# Patient Record
Sex: FEMALE | Race: BLACK OR AFRICAN AMERICAN | NOT HISPANIC OR LATINO | Employment: OTHER | URBAN - METROPOLITAN AREA
[De-identification: names, ages, dates, MRNs, and addresses within clinical notes are randomized per-mention and may not be internally consistent; named-entity substitution may affect disease eponyms.]

---

## 2018-05-22 ENCOUNTER — HOSPITAL ENCOUNTER (EMERGENCY)
Facility: HOSPITAL | Age: 68
Discharge: HOME | End: 2018-05-22
Attending: EMERGENCY MEDICINE
Payer: MEDICARE

## 2018-05-22 VITALS
HEART RATE: 62 BPM | DIASTOLIC BLOOD PRESSURE: 70 MMHG | TEMPERATURE: 98 F | RESPIRATION RATE: 20 BRPM | SYSTOLIC BLOOD PRESSURE: 166 MMHG | BODY MASS INDEX: 24.02 KG/M2 | WEIGHT: 158 LBS | OXYGEN SATURATION: 99 %
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
## 2018-05-22 DIAGNOSIS — N30.00 ACUTE CYSTITIS WITHOUT HEMATURIA: ICD-10-CM

## 2018-05-22 LAB
ANION GAP SERPL CALC-SCNC: 14 MMOL/L
BACTERIA #/AREA URNS AUTO: ABNORMAL /HPF
BASOPHILS # BLD AUTO: 0.04 K/UL
BASOPHILS NFR BLD: 0.4 %
BILIRUB UR QL STRIP: NEGATIVE
BUN SERPL-MCNC: 21 MG/DL
BUN SERPL-MCNC: 21 MG/DL (ref 6–30)
CALCIUM SERPL-MCNC: 10.6 MG/DL
CHLORIDE SERPL-SCNC: 103 MMOL/L
CHLORIDE SERPL-SCNC: 103 MMOL/L (ref 95–110)
CLARITY UR REFRACT.AUTO: ABNORMAL
CO2 SERPL-SCNC: 22 MMOL/L
COLOR UR AUTO: YELLOW
CREAT SERPL-MCNC: 1 MG/DL
CREAT SERPL-MCNC: 1.1 MG/DL (ref 0.5–1.4)
CREAT SERPL-MCNC: 1.4 MG/DL
DIFFERENTIAL METHOD: ABNORMAL
EOSINOPHIL # BLD AUTO: 0.2 K/UL
EOSINOPHIL NFR BLD: 1.4 %
ERYTHROCYTE [DISTWIDTH] IN BLOOD BY AUTOMATED COUNT: 13.2 %
EST. GFR  (AFRICAN AMERICAN): 45.5 ML/MIN/1.73 M^2
EST. GFR  (AFRICAN AMERICAN): >60 ML/MIN/1.73 M^2
EST. GFR  (NON AFRICAN AMERICAN): 39.5 ML/MIN/1.73 M^2
EST. GFR  (NON AFRICAN AMERICAN): 59.3 ML/MIN/1.73 M^2
GLUCOSE SERPL-MCNC: 200 MG/DL
GLUCOSE SERPL-MCNC: 207 MG/DL (ref 70–110)
GLUCOSE UR QL STRIP: ABNORMAL
HCT VFR BLD AUTO: 38.2 %
HCT VFR BLD CALC: 36 %PCV (ref 36–54)
HGB BLD-MCNC: 12.2 G/DL
HGB UR QL STRIP: ABNORMAL
HYALINE CASTS UR QL AUTO: 0 /LPF
IMM GRANULOCYTES # BLD AUTO: 0.07 K/UL
IMM GRANULOCYTES NFR BLD AUTO: 0.7 %
KETONES UR QL STRIP: NEGATIVE
LEUKOCYTE ESTERASE UR QL STRIP: ABNORMAL
LYMPHOCYTES # BLD AUTO: 2.9 K/UL
LYMPHOCYTES NFR BLD: 28 %
MCH RBC QN AUTO: 29.2 PG
MCHC RBC AUTO-ENTMCNC: 31.9 G/DL
MCV RBC AUTO: 91 FL
MICROSCOPIC COMMENT: ABNORMAL
MONOCYTES # BLD AUTO: 1 K/UL
MONOCYTES NFR BLD: 9.9 %
NEUTROPHILS # BLD AUTO: 6.2 K/UL
NEUTROPHILS NFR BLD: 59.6 %
NITRITE UR QL STRIP: NEGATIVE
NRBC BLD-RTO: 0 /100 WBC
PH UR STRIP: 5 [PH] (ref 5–8)
PLATELET # BLD AUTO: 389 K/UL
PMV BLD AUTO: 10.2 FL
POC IONIZED CALCIUM: 1.16 MMOL/L (ref 1.06–1.42)
POC TCO2 (MEASURED): 25 MMOL/L (ref 23–29)
POTASSIUM BLD-SCNC: 3.5 MMOL/L (ref 3.5–5.1)
POTASSIUM SERPL-SCNC: 3.7 MMOL/L
PROT UR QL STRIP: ABNORMAL
RBC # BLD AUTO: 4.18 M/UL
RBC #/AREA URNS AUTO: 8 /HPF (ref 0–4)
SAMPLE: ABNORMAL
SODIUM BLD-SCNC: 138 MMOL/L (ref 136–145)
SODIUM SERPL-SCNC: 139 MMOL/L
SP GR UR STRIP: >=1.03 (ref 1–1.03)
SQUAMOUS #/AREA URNS AUTO: 24 /HPF
URN SPEC COLLECT METH UR: ABNORMAL
UROBILINOGEN UR STRIP-ACNC: NEGATIVE EU/DL
WBC # BLD AUTO: 10.46 K/UL
WBC #/AREA URNS AUTO: 14 /HPF (ref 0–5)
YEAST UR QL AUTO: ABNORMAL

## 2018-05-22 PROCEDURE — 96374 THER/PROPH/DIAG INJ IV PUSH: CPT | Mod: 59

## 2018-05-22 PROCEDURE — 25500020 PHARM REV CODE 255: Performed by: EMERGENCY MEDICINE

## 2018-05-22 PROCEDURE — 81001 URINALYSIS AUTO W/SCOPE: CPT

## 2018-05-22 PROCEDURE — 87086 URINE CULTURE/COLONY COUNT: CPT

## 2018-05-22 PROCEDURE — 25000003 PHARM REV CODE 250: Performed by: STUDENT IN AN ORGANIZED HEALTH CARE EDUCATION/TRAINING PROGRAM

## 2018-05-22 PROCEDURE — 96361 HYDRATE IV INFUSION ADD-ON: CPT

## 2018-05-22 PROCEDURE — 25000003 PHARM REV CODE 250: Performed by: EMERGENCY MEDICINE

## 2018-05-22 PROCEDURE — 80048 BASIC METABOLIC PNL TOTAL CA: CPT

## 2018-05-22 PROCEDURE — 85025 COMPLETE CBC W/AUTO DIFF WBC: CPT

## 2018-05-22 PROCEDURE — 63600175 PHARM REV CODE 636 W HCPCS: Performed by: EMERGENCY MEDICINE

## 2018-05-22 PROCEDURE — 99284 EMERGENCY DEPT VISIT MOD MDM: CPT | Mod: 25

## 2018-05-22 PROCEDURE — 82565 ASSAY OF CREATININE: CPT | Mod: 91

## 2018-05-22 RX ORDER — MORPHINE SULFATE 4 MG/ML
4 INJECTION, SOLUTION INTRAMUSCULAR; INTRAVENOUS
Status: COMPLETED | OUTPATIENT
Start: 2018-05-22 | End: 2018-05-22

## 2018-05-22 RX ORDER — ACETAMINOPHEN 500 MG
1000 TABLET ORAL
Status: COMPLETED | OUTPATIENT
Start: 2018-05-22 | End: 2018-05-22

## 2018-05-22 RX ORDER — CIPROFLOXACIN 500 MG/1
500 TABLET ORAL EVERY 12 HOURS
Status: DISCONTINUED | OUTPATIENT
Start: 2018-05-22 | End: 2018-05-22 | Stop reason: HOSPADM

## 2018-05-22 RX ORDER — CIPROFLOXACIN 500 MG/1
500 TABLET ORAL 2 TIMES DAILY
Qty: 20 TABLET | Refills: 0 | Status: SHIPPED | OUTPATIENT
Start: 2018-05-22 | End: 2018-05-29

## 2018-05-22 RX ADMIN — MORPHINE SULFATE 4 MG: 4 INJECTION INTRAVENOUS at 11:05

## 2018-05-22 RX ADMIN — ACETAMINOPHEN 1000 MG: 500 TABLET, FILM COATED ORAL at 10:05

## 2018-05-22 RX ADMIN — IOHEXOL 75 ML: 350 INJECTION, SOLUTION INTRAVENOUS at 11:05

## 2018-05-22 RX ADMIN — SODIUM CHLORIDE 1000 ML: 0.9 INJECTION, SOLUTION INTRAVENOUS at 01:05

## 2018-05-22 RX ADMIN — CIPROFLOXACIN HYDROCHLORIDE 500 MG: 500 TABLET, FILM COATED ORAL at 02:05

## 2018-05-22 NOTE — ED TRIAGE NOTES
Maribell Pinto, a 67 y.o. female presents to the ED c/o L flank and Lleg cramping since this am.  Denies hematuria or dysuria.       Chief Complaint   Patient presents with    leg cramps     intermittent left leg cramps since stroke 1.5 years ago. Reports pain to site with cramps     Review of patient's allergies indicates:  No Known Allergies  Past Medical History:   Diagnosis Date    CVA (cerebral infarction)     Diabetes mellitus     Hypertension      LOC: Patient name and date of birth verified. The patient is awake, alert and aware of environment with an appropriate affect, the patient is oriented x 3 and speaking appropriately.   APPEARANCE: Patient resting comfortably, patient is clean and well groomed, patient's clothing is properly fastened.  SKIN: The skin is warm and dry, color consistent with ethnicity, patient has normal skin turgor and moist mucus membranes, skin intact, no breakdown or bruising noted.  MUSCULOSKELETAL: Patient moving all extremities well, no obvious swelling or deformities noted.   RESPIRATORY: Respirations are spontaneous, patient has a normal effort and rate, no accessory muscle use noted.  CARDIAC: Patient has a normal rate and rhythm, no periphreal edema noted, capillary refill < 3 seconds.  ABDOMEN: Soft and non tender to palpation, no distention noted. Bowel sounds present in all four quadrants.  NEUROLOGIC: Eyes open spontaneously, behavior appropriate to situation, follows commands, facial expression symmetrical, bilateral hand grasp equal and even, purposeful motor response noted, normal sensation in all extremities when touched with a finger.

## 2018-05-22 NOTE — ED PROVIDER NOTES
"Encounter Date: 5/22/2018       History     Chief Complaint   Patient presents with    leg cramps     intermittent left leg cramps since stroke 1.5 years ago. Reports pain to site with cramps     Ms. Maribell Pinto is a 67 yr old female with DM, HTN, and PMHx of CVA who presents to [facility] ED with fall. Patient states that she lost her balance this morning while reaching for food. Prior to fall, patient felt groggy and off-balance, which she attributes to the methocarbamol that she took the night before. She takes this nightly and often experiences these symptoms in the morning. Currently complains of severe left-sided abdominal pain described as "sore;" it is worst anteriorly but extends to left flank and groin. Also has left elbow pain. No head trauma, light-headedness, myoclonic jerking, urinary or fecal incontinence, tongue biting. She does not recall if she lost consciousness. She was alone in her house at the time. Also fell one time 2 months ago due to lower extremity cramping; no injuries sustained at the time.           Review of patient's allergies indicates:  No Known Allergies  Past Medical History:   Diagnosis Date    CVA (cerebral infarction)     Diabetes mellitus     Hypertension      Past Surgical History:   Procedure Laterality Date    APPENDECTOMY      COLONOSCOPY N/A 9/26/2016    Procedure: COLONOSCOPY;  Surgeon: Jose R Michaels MD;  Location: [address];  Service: Endoscopy;  Laterality: N/A;  patient to hold Plavix 5 days prior to procedure per Dr. Anand Mcdermott of [facility]  PM prep    HYSTERECTOMY      LEG SURGERY      TUBAL LIGATION       Family History   Problem Relation Age of Onset    Colon cancer Neg Hx     Inflammatory bowel disease Neg Hx     Celiac disease Neg Hx      Social History   Substance Use Topics    Smoking status: Former Smoker     Quit date: 5/20/2016    Smokeless tobacco: Not on file    Alcohol use No     Review of Systems   Constitutional: " Negative for chills and fever.   Eyes: Negative for visual disturbance.   Respiratory: Negative for cough and shortness of breath.    Cardiovascular: Negative for chest pain, palpitations and leg swelling.   Gastrointestinal: Positive for abdominal pain (left sided).   Genitourinary: Positive for flank pain (left sided). Negative for dysuria, frequency and urgency.   Musculoskeletal: Positive for arthralgias (left elbow).        Lower extremity cramps   Skin: Negative for color change and rash.   Neurological: Positive for weakness. Negative for seizures, light-headedness, numbness and headaches.   Hematological: Does not bruise/bleed easily.   Psychiatric/Behavioral: Negative for confusion.       Physical Exam     Initial Vitals [05/22/18 0955]   BP Pulse Resp Temp SpO2   (!) 178/79 88 18 98.2 °F (36.8 °C) 99 %      MAP       112         Physical Exam    Constitutional: She appears well-developed and well-nourished.   HENT:   Head: Normocephalic and atraumatic.   Eyes: Pupils are equal, round, and reactive to light.   Neck: Normal range of motion.   Cardiovascular: Normal rate, regular rhythm and normal heart sounds.   Pulmonary/Chest: No respiratory distress. She has no wheezes. She has no rhonchi. She has no rales.   Abdominal: Soft. Bowel sounds are normal. There is tenderness (left anterior & lateral abdomen).   Genitourinary:   Genitourinary Comments: Left flank tenderness   Musculoskeletal: Normal range of motion. She exhibits no edema or tenderness.   Pain left arm with movement   Neurological: She is alert and oriented to person, place, and time. She has normal strength. No cranial nerve deficit or sensory deficit.   Skin: Skin is warm and dry. No rash noted. There is erythema (bruising of left elbow). No pallor.   Psychiatric: She has a normal mood and affect. Thought content normal.         ED Course   Procedures  Labs Reviewed - No data to display       X-Rays:   Independently Interpreted Readings:    Chest X-Ray: No significant intrathoracic abnormality.  No significant detrimental interval change in the appearance of the chest since 02/15/2018.   Abdomen: 1. In this patient with history of fall, there are no CT findings to correlate with solid organ injury.  No visualized acute left rib fracture.  There is soft tissue induration of the anterior abdominal wall, similar in appearance to the examination of 2016, no focal organized collection.  2. Bilateral hypodense adrenal nodules which are nonspecific and appear grossly stable.  3. Stable hepatic and right renal cysts.  4. Possible hepatic steatosis, correlation with LFTs recommended.   Other Readings:  Xray Humerus & Elbow:  Visualized osseous structures appear intact, with no definite evidence of recent fracture or other significant abnormality identified.    Medical Decision Making:   Initial Assessment:   Ms. Maribell Pinto is a 67 yr old female who presents with abdominal pain related to fall. Overall patient is in severe pain. Rib fracture vs muscle strain. Given left-sided location, concern for splenic laceration or renal laceration given involvement of flank & groin. In regards to possible related blood loss/hypovolemia, patient's vitals are stable, she denies light-headedness.  Differential Diagnosis:   Rib fracture vs muscle strain vs splenic laceration vs renal laceration.   Independently Interpreted Test(s):   I have ordered and independently interpreted X-rays - see summary below.  I have ordered and independently interpreted EKG Reading(s) - see summary below  Clinical Tests:   Lab Tests: Ordered and Reviewed       <> Summary of Lab: CBC unremarkable, Hb/Hct normal  Cr 1.4 (baseline 0.8)  No electrolyte abnormalities  Glucose elevated 200  UA with 3+ leukocytes, 14 wbc, many bacteria    Radiological Study: Ordered and Reviewed  ED Management:  CBC, CMP, Mg, P  Imaging-CT abd/pelv, Xray upper extremity, CXR  Pain control-morphine 4mg, IV tylenol  1000mg     2:14 PM  CMP with elevated creatinine. Giving 1L normal saline. Will repeat creatinine after.  UA with UTI; will start ciprofloxacin 500mg PO  Other:   I discussed test(s) with the performing physician.                      Clinical Impression:   Diagnoses of Fall and Acute Cystitis were pertinent to this visit.                           Maria C Montalvo MD  Resident  05/22/18 [id number]

## 2018-05-23 LAB
BACTERIA UR CULT: NORMAL
BACTERIA UR CULT: NORMAL